# Patient Record
Sex: FEMALE | Race: WHITE
[De-identification: names, ages, dates, MRNs, and addresses within clinical notes are randomized per-mention and may not be internally consistent; named-entity substitution may affect disease eponyms.]

---

## 2017-04-17 ENCOUNTER — HOSPITAL ENCOUNTER (EMERGENCY)
Dept: HOSPITAL 25 - UCCORT | Age: 57
Discharge: HOME | End: 2017-04-17
Payer: COMMERCIAL

## 2017-04-17 VITALS — SYSTOLIC BLOOD PRESSURE: 127 MMHG | DIASTOLIC BLOOD PRESSURE: 73 MMHG

## 2017-04-17 DIAGNOSIS — Z98.84: ICD-10-CM

## 2017-04-17 DIAGNOSIS — J32.9: Primary | ICD-10-CM

## 2017-04-17 DIAGNOSIS — J45.909: ICD-10-CM

## 2017-04-17 DIAGNOSIS — E05.90: ICD-10-CM

## 2017-04-17 DIAGNOSIS — Z88.5: ICD-10-CM

## 2017-04-17 DIAGNOSIS — Z90.710: ICD-10-CM

## 2017-04-17 DIAGNOSIS — Z87.891: ICD-10-CM

## 2017-04-17 PROCEDURE — 99212 OFFICE O/P EST SF 10 MIN: CPT

## 2017-04-17 PROCEDURE — G0463 HOSPITAL OUTPT CLINIC VISIT: HCPCS

## 2017-04-17 NOTE — UC
Throat Pain/Nasal Gamal HPI





- History of Current Complaint


Chief Complaint: UCRespiratory


Stated Complaint: SINUS CONGESTION


Time Seen by Provider: 04/17/17 08:46


Hx Last Menstrual Period: age 35 yo





- Allergies/Home Medications


Allergies/Adverse Reactions: 


 Allergies











Allergy/AdvReac Type Severity Reaction Status Date / Time


 


Morphine AdvReac Severe Vomiting Verified 04/17/17 08:44


 


environmental Allergy  Eyes, Uncoded 04/17/17 08:44





   runny nose  














PMH/Surg Hx/FS Hx/Imm Hx


Endocrine History Of: Reports: Thyroid Disease - hyper


   Denies: Diabetes


Cardiovascular History Of: 


   Denies: Cardiac Disorders, Hypertension


Respiratory History Of: Reports: Asthma, Bronchitis


   Denies: COPD


GI/ History Of: 


   Denies: Ulcer


Cancer History Of: 


   Denies: Breast Cancer





- Surgical History


Surgical History: Yes


Surgery Procedure, Year, and Place: Hysterectomy age 34.  gastric sleeve





- Family History


Known Family History: Positive: None, Cardiac Disease - mi in mid 30's, Renal 

Disease, Other - familial hypercholesterolemia





- Social History


Occupation: Employed Full-time


Lives: With Family


Alcohol Use: Weekly


Alcohol Amount: 4


Substance Use Type: None


Smoking Status (MU): Former Smoker


Type: Cigarettes


Amount Used/How Often: 1/2 PACK A DAY


Length of Time of Smoking/Using Tobacco: 10 YEARS


Have You Smoked in the Last Year: No - STARTED AGE 18 QUIT AGE 28


When Did the Patient Quit Smoking/Using Tobacco: 28 YRS AGO





- Immunization History


Most Recent Influenza Vaccination: 2016


Most Recent Tetanus Shot: UP TO DATE


Most Recent Pneumonia Vaccination: HAS HAD





Review of Systems


Constitutional: Negative


Skin: Negative


Eyes: Negative


ENT: Epistaxis, Sore Throat, Nasal Discharge, Other - sinus congestion/pain


Respiratory: Cough


Cardiovascular: Negative


Neurological: Headache - sinus


All Other Systems Reviewed And Are Negative: Yes





Physical Exam


Triage Information Reviewed: Yes


Appearance: Well-Appearing, No Pain Distress, Well-Nourished


Vital Signs: 


 Initial Vital Signs











Temp  97.2 F   04/17/17 08:45


 


Pulse  68   04/17/17 08:45


 


Resp  18   04/17/17 08:45


 


BP  127/73   04/17/17 08:45


 


Pulse Ox  100   04/17/17 08:45











Vital Signs Reviewed: Yes


Eyes: Positive: Conjunctiva Clear.  Negative: Discharge


ENT: Positive: Hearing grossly normal, Pharyngeal erythema - mild, Nasal 

congestion, Nasal drainage, TMs normal.  Negative: Tonsillar swelling, 

Tonsillar exudate, Muffled/hoarse voice


Dental Exam: Normal


Neck: Positive: Supple, Nontender


Respiratory: Positive: Lungs clear, Normal breath sounds, No respiratory 

distress, No accessory muscle use


Cardiovascular: Positive: RRR, No Murmur


Musculoskeletal Exam: Normal


Neurological: Positive: Alert, Muscle Tone Normal


Psychological: Positive: Age Appropriate Behavior


Skin Exam: Normal





Throat Pain/Nasal Course/Dx





- Differential Dx/Diagnosis


Differential Diagnosis/HQI/PQRI: Pharyngitis, Sinusitis, Tonsillitis, URI


Provider Diagnoses: sinusitis





Discharge





- Discharge Plan


Condition: Stable


Disposition: HOME


Prescriptions: 


Amoxicillin/Clavulanate TAB* [Augmentin *] 875 mg PO BID #19 tab


Patient Education Materials:  Sinusitis (ED)


Referrals: 


Sakshi Herrera [Primary Care Provider] - If Needed


Additional Instructions: 


TRY USING THE NETTI POT IN THE MORNINGS AS DISCUSSED.  YOU MUST ALWAYS USE 

CLEAN WATER.





REMEMBER, POSTURE IS AN IMPORTANT FACTOR IN SINUS DRAINAGE.  MOVE YOUR NECK, 

BREATHE.





AUGMENTIN:


     Augmentin is a mixture of amoxicillin and clavulanate. Amoxicillin is a 

member of the penicillin family.  It covers the germs likely to cause ear, 

bronchial, and urinary infections better than plain penicillin.  The addition 

of clavulanate allows it to cover staph infections of the skin, as well as 

resistant cases of ear and sinus infections.  Your physician has chosen 

Augmentin for you because of the special nature of your situation.


     Augmentin is best taken with meals.  Nausea after taking the medication is 

rare, but can occur.  Diarrhea can occur, particularly in small children.  

Vaginal yeast infections, and oral thrush in infants are also common.  Contact 

your physician if these problems occur.


     Allergy to penicillins is common.  If you have had an allergic reaction to 

any drug of the penicillin family, you should never take any other penicillin.  

Notify your doctor at once if you develop hives, shortness of breath, swelling, 

or faintness.





ANY TIME YOU TAKE AN ANTIBIOTIC, IT IS IMPORTANT TO REPLENISH THE BODY'S 

BALANCE OF "GOOD" BACTERIA BY EATING HIGH QUALITY CULTURED FOOD SUCH AS YOGURT, 

SAURKRAUT OR YADIEL CHI AND/OR TAKING A PROBIOTIC SUPPLEMENT.

## 2017-10-08 ENCOUNTER — HOSPITAL ENCOUNTER (EMERGENCY)
Dept: HOSPITAL 25 - UCCORT | Age: 57
Discharge: HOME | End: 2017-10-08
Payer: COMMERCIAL

## 2017-10-08 VITALS — SYSTOLIC BLOOD PRESSURE: 109 MMHG | DIASTOLIC BLOOD PRESSURE: 62 MMHG

## 2017-10-08 DIAGNOSIS — S61.012A: Primary | ICD-10-CM

## 2017-10-08 DIAGNOSIS — Z88.5: ICD-10-CM

## 2017-10-08 DIAGNOSIS — Z87.891: ICD-10-CM
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
## 2017-10-08 PROCEDURE — G0463 HOSPITAL OUTPT CLINIC VISIT: HCPCS

## 2017-10-08 PROCEDURE — 12001 RPR S/N/AX/GEN/TRNK 2.5CM/<: CPT

## 2017-10-08 PROCEDURE — 99211 OFF/OP EST MAY X REQ PHY/QHP: CPT

## 2017-10-08 NOTE — UC
Laceration HPI





- HPI Summary


HPI Summary: 


Cut left thumb cutting up squash for a barefoot aleja recipe





- History Of Current Complaint


Chief Complaint: UCLaceration


Stated Complaint: LEFT THUMB LAC


Time Seen by Provider: 10/08/17 16:23


Hx Obtained From: Patient


Hx Last Menstrual Period: N/A


Laceration Location: Finger - left thumb


Mechanism Of Injury: Sharp Trauma


Onset/Duration: Sudden Onset


Severity: Mild


Aggravating Factors: Movement


Hands: 


  __________________________














  __________________________





 1 - laceration





Related History: Dominant Hand Right





- Allergies/Home Medications


Allergies/Adverse Reactions: 


 Allergies











Allergy/AdvReac Type Severity Reaction Status Date / Time


 


Morphine AdvReac Severe Vomiting Verified 10/08/17 16:25


 


environmental Allergy  Eyes, Uncoded 10/08/17 16:25





   runny nose  














PMH/Surg Hx/FS Hx/Imm Hx


Previously Healthy: Yes





- Surgical History


Surgical History: Yes


Surgery Procedure, Year, and Place: Hysterectomy age 34.  gastric sleeve





- Family History


Known Family History: Positive: None, Cardiac Disease - mi in mid 30's, Renal 

Disease, Other - familial hypercholesterolemia





- Social History


Occupation: Employed Full-time


Lives: With Family


Alcohol Use: Weekly


Alcohol Amount: 4


Substance Use Type: None


Smoking Status (MU): Former Smoker


Type: Cigarettes


Amount Used/How Often: 1/2 PACK A DAY


Length of Time of Smoking/Using Tobacco: 10 YEARS


Have You Smoked in the Last Year: No - STARTED AGE 18 QUIT AGE 28


When Did the Patient Quit Smoking/Using Tobacco: 28 YRS OLD





- Immunization History


Most Recent Influenza Vaccination: 2016


Most Recent Tetanus Shot: UP TO DATE


Most Recent Pneumonia Vaccination: HAS HAD





Review of Systems


Skin: Bruising - under the cut


Is Patient Immunocompromised?: No


All Other Systems Reviewed And Are Negative: Yes





Physical Exam


Triage Information Reviewed: Yes


Appearance: Well-Appearing, No Pain Distress, Well-Nourished


Vital Signs: 


 Initial Vital Signs











Temp  97.8 F   10/08/17 16:18


 


Pulse  77   10/08/17 16:18


 


Resp  20   10/08/17 16:18


 


BP  109/62   10/08/17 16:18











Vital Signs Reviewed: Yes


Eyes: Positive: Conjunctiva Clear


Respiratory Exam: Normal


Cardiovascular Exam: Normal


Musculoskeletal Exam: Normal


Neurological Exam: Normal


Psychological Exam: Normal


Skin: Positive: Other - left thumb laceration





Laceration Repair





- Laceration Repair


  ** 1


Description: Linear


Laceration Size After Repair: Length (cm) - 1.8 cm


Modified For Repair: No


Type Injection: Local


Anesthesia Used: 1.0% Lido


Cleansing Completed Via Routine Prep: Yes


Irrigation With Pressure Irrigation Device: Yes


Closure Material: Sutures


Closure Method: Single Layer


Suture Of: Skin - #7 sutures


Suture Type: Nylon - 4-0





Laceration Course/Dx





- Differential Dx - Laceration/Wound


Differental Diagnoses: Abrasion, Abscess, Avulsion, Laceration


Provider Diagnoses: 1.8 cm laceration left thumb





Discharge





- Discharge Plan


Condition: Stable


Disposition: HOME


Patient Education Materials:  Laceration (ED), Care For Your Stitches (ED)


Additional Instructions: 


F/U 10 days for suture removal.

## 2018-03-15 ENCOUNTER — HOSPITAL ENCOUNTER (EMERGENCY)
Dept: HOSPITAL 25 - UCCORT | Age: 58
Discharge: HOME | End: 2018-03-15
Payer: COMMERCIAL

## 2018-03-15 VITALS — DIASTOLIC BLOOD PRESSURE: 72 MMHG | SYSTOLIC BLOOD PRESSURE: 124 MMHG

## 2018-03-15 DIAGNOSIS — B34.9: Primary | ICD-10-CM

## 2018-03-15 PROCEDURE — 99211 OFF/OP EST MAY X REQ PHY/QHP: CPT

## 2018-03-15 PROCEDURE — 87502 INFLUENZA DNA AMP PROBE: CPT

## 2018-03-15 PROCEDURE — G0463 HOSPITAL OUTPT CLINIC VISIT: HCPCS

## 2018-03-15 NOTE — UC
Respiratory Complaint HPI





- HPI Summary


HPI Summary: 





fever/ chills since this morning


sever body aches, fatigue,


+ cough, sore throat


no n/v/d/c , no abdominal pain, no urinary sx





- History of Current Complaint


Chief Complaint: UCGeneralIllness


Stated Complaint: CHILLS,FEVER,BODY ACHES


Time Seen by Provider: 03/15/18 13:14


Hx Obtained From: Patient


Hx Last Menstrual Period: N/A


Onset/Duration: Gradual Onset, Lasting Days - 1, Still Present


Timing: Constant


Severity Initially: Severe


Severity Currently: Moderate


Pain Intensity: 0


Character: Cough: Nonproductive


Aggravating Factors: Allergens, Deep Breaths


Alleviating Factors: Nothing


Associated Signs And Symptoms: Positive: Fever, Chills, Wheezing, URI, Nasal 

Congestion.  Negative: Hemoptysis, Dizziness, Calf Pain, Calf Swelling





- Allergies/Home Medications


Allergies/Adverse Reactions: 


 Allergies











Allergy/AdvReac Type Severity Reaction Status Date / Time


 


morphine Allergy  Vomiting Verified 03/15/18 13:17


 


environmental Allergy  Eyes, Uncoded 03/15/18 13:17





   runny nose  














PMH/Surg Hx/FS Hx/Imm Hx


Endocrine History: Thyroid Disease





- Surgical History


Surgical History: Yes


Surgery Procedure, Year, and Place: Hysterectomy age 34.  gastric sleeve





- Family History


Known Family History: Positive: None, Cardiac Disease - mi in mid 30's, Renal 

Disease, Other - familial hypercholesterolemia





- Social History


Alcohol Use: Weekly


Alcohol Amount: 4


Substance Use Type: None


Smoking Status (MU): Former Smoker


Type: Cigarettes


Amount Used/How Often: 1/2 PACK A DAY


Length of Time of Smoking/Using Tobacco: 10 YEARS


Have You Smoked in the Last Year: No - STARTED AGE 18 QUIT AGE 28


When Did the Patient Quit Smoking/Using Tobacco: 28 YRS OLD





- Immunization History


Most Recent Influenza Vaccination: 2016


Most Recent Tetanus Shot: UP TO DATE


Most Recent Pneumonia Vaccination: HAS HAD





Review of Systems


Constitutional: Fever, Chills, Fatigue


Skin: Negative


Eyes: Negative


ENT: Sore Throat


Respiratory: Cough


Cardiovascular: Negative


Gastrointestinal: Negative


Genitourinary: Negative


Musculoskeletal: Arthralgia, Myalgia


Is Patient Immunocompromised?: No


All Other Systems Reviewed And Are Negative: Yes





Physical Exam


Triage Information Reviewed: Yes


Appearance: Well-Appearing, Well-Nourished


Vital Signs: 


 Initial Vital Signs











Temp  97.1 F   03/15/18 13:11


 


Pulse  75   03/15/18 13:11


 


Resp  14   03/15/18 13:11


 


BP  124/72   03/15/18 13:11


 


Pulse Ox  100   03/15/18 13:11











Vital Signs Reviewed: Yes


Eyes: Positive: Conjunctiva Clear


ENT: Positive: Normal ENT inspection, Hearing grossly normal, Pharynx normal


Neck: Positive: Supple, Nontender, No Lymphadenopathy


Respiratory: Positive: Chest non-tender, Lungs clear, Normal breath sounds


Cardiovascular: Positive: RRR, No Murmur, Pulses Normal


Abdominal Exam: Normal


Abdomen Description: Positive: Nontender, Soft.  Negative: Distended, Guarding


Bowel Sounds: Positive: Present


Skin Exam: Normal





UC Diagnostic Evaluation





- Laboratory


O2 Sat by Pulse Oximetry: 100





Respiratory Course/Dx





- Differential Dx/Diagnosis


Provider Diagnoses: viral illness





Discharge





- Discharge Plan


Condition: Stable


Disposition: HOME


Patient Education Materials:  Viral Syndrome (ED)


Referrals: 


Sakshi Herrera [Primary Care Provider] - If Needed

## 2018-05-29 ENCOUNTER — HOSPITAL ENCOUNTER (EMERGENCY)
Dept: HOSPITAL 25 - UCCORT | Age: 58
Discharge: HOME | End: 2018-05-29
Payer: COMMERCIAL

## 2018-05-29 VITALS — SYSTOLIC BLOOD PRESSURE: 120 MMHG | DIASTOLIC BLOOD PRESSURE: 77 MMHG

## 2018-05-29 DIAGNOSIS — Z98.84: ICD-10-CM

## 2018-05-29 DIAGNOSIS — Z90.710: ICD-10-CM

## 2018-05-29 DIAGNOSIS — Z87.891: ICD-10-CM

## 2018-05-29 DIAGNOSIS — Z88.5: ICD-10-CM

## 2018-05-29 DIAGNOSIS — J34.89: ICD-10-CM

## 2018-05-29 DIAGNOSIS — B02.9: Primary | ICD-10-CM

## 2018-05-29 DIAGNOSIS — J45.909: ICD-10-CM

## 2018-05-29 DIAGNOSIS — E78.5: ICD-10-CM

## 2018-05-29 PROCEDURE — G0463 HOSPITAL OUTPT CLINIC VISIT: HCPCS

## 2018-05-29 PROCEDURE — 99212 OFFICE O/P EST SF 10 MIN: CPT

## 2018-05-29 NOTE — UC
General HPI





- HPI Summary


HPI Summary: 





pt c/o malaise since end of last week. past 1-2 days noted some irritation over 

upper R breast and now has a rash that is still evolving. also notes a single 

pink spot on her R abdomen. + hx chicken pox. no shingles vaccination. pt also 

notes a sore inside her L nare. no hx mrsa.





- History of Current Complaint


Chief Complaint: UCRash


Stated Complaint: RASH


Time Seen by Provider: 05/29/18 16:12


Hx Obtained From: Patient


Hx Last Menstrual Period: N/A


Onset/Duration: Gradual Onset


Timing: Constant


Pain Intensity: 0


Aggravating: nothing


Alleviating: nothing


Associated Signs & Symptoms: Positive: Headache, Weakness





- Allergy/Home Medications


Allergies/Adverse Reactions: 


 Allergies











Allergy/AdvReac Type Severity Reaction Status Date / Time


 


morphine Allergy  Vomiting Verified 03/15/18 13:17


 


environmental Allergy  Eyes, Uncoded 03/15/18 13:17





   runny nose  














PMH/Surg Hx/FS Hx/Imm Hx


Endocrine History: Dyslipidemia


Respiratory History: Asthma





- Surgical History


Surgical History: Yes


Surgery Procedure, Year, and Place: Hysterectomy age 34.  gastric sleeve





- Family History


Known Family History: Positive: None, Cardiac Disease - mi in mid 30's, Renal 

Disease, Other - familial hypercholesterolemia





- Social History


Occupation: Employed Full-time


Lives: With Family


Alcohol Use: Daily


Alcohol Amount: glass of wine


Substance Use Type: None


Smoking Status (MU): Former Smoker


Type: Cigarettes


Amount Used/How Often: 1/2 PACK A DAY


Length of Time of Smoking/Using Tobacco: 10 YEARS


Have You Smoked in the Last Year: No - STARTED AGE 18 QUIT AGE 28


When Did the Patient Quit Smoking/Using Tobacco: 28 YRS OLD





- Immunization History


Most Recent Influenza Vaccination: 2016


Most Recent Tetanus Shot: UP TO DATE


Most Recent Pneumonia Vaccination: HAS HAD


Vaccination Up to Date: Yes





Review of Systems


Constitutional: Fatigue


Skin: Rash


Eyes: Negative


ENT: Negative


Respiratory: Negative


Cardiovascular: Negative


Gastrointestinal: Negative


Genitourinary: Negative


Motor: Negative


Neurovascular: Negative


Musculoskeletal: Myalgia


Neurological: Weakness


Psychological: Negative


Is Patient Immunocompromised?: No


All Other Systems Reviewed And Are Negative: Yes





Physical Exam


Triage Information Reviewed: Yes


Appearance: Well-Appearing


Vital Signs: 


 Initial Vital Signs











Temp  97.8 F   05/29/18 15:54


 


Pulse  68   05/29/18 15:54


 


Resp  17   05/29/18 15:54


 


BP  120/77   05/29/18 15:54


 


Pulse Ox  100   05/29/18 15:54











Vital Signs Reviewed: Yes


Eyes: Positive: Conjunctiva Clear


ENT: Positive: Pharynx normal, TMs normal, Other - small sore inside L nare 

that is tender.  Negative: Nasal congestion, Nasal drainage


Neck: Positive: Supple, Nontender, No Lymphadenopathy


Respiratory: Positive: Lungs clear, Normal breath sounds


Cardiovascular: Positive: RRR, No Murmur


Abdomen Description: Positive: Nontender, No Organomegaly, Soft


Bowel Sounds: Positive: Present


Musculoskeletal: Positive: ROM Intact


Neurological: Positive: Alert


Psychological: Positive: Age Appropriate Behavior


Skin Exam: Normal


Skin: Positive: rashes - 3 pink circular clusters across R upper breast with 

excoriation. isolated pink dot R abdomen. No axillary adenopathy.





Course/Dx





- Course


Course Of Treatment: isolated sore in L nare, will tx bactroban ointment.  rash 

was preceeded by general malaise and irritation to that area followed by the 

rash. + hx varicella. does not look c/w insect bites thus most c/w shingles. 

will tx with famvir.





- Differential Dx - Multi-Symptom


Provider Diagnoses: shingles. Sore L nare





Discharge





- Sign-Out/Discharge


Documenting (check all that apply): Discharge/Admit/Transfer





- Discharge Plan


Condition: Stable


Disposition: HOME


Prescriptions: 


Famciclovir(NF) [Famvir(NF)] 500 mg PO TID #21 tab


Mupirocin 2% OINT* [Bactroban 2 % Oint*] 1 applic TOPICAL BID 7 Days #1 tube


Patient Education Materials:  Shingles (ED)


Referrals: 


Sakshi Herrera [Primary Care Provider] - 7 Days





- Billing Disposition and Condition


Condition: STABLE


Disposition: HOME

## 2018-09-23 ENCOUNTER — HOSPITAL ENCOUNTER (EMERGENCY)
Dept: HOSPITAL 25 - UCCORT | Age: 58
Discharge: HOME | End: 2018-09-23
Payer: COMMERCIAL

## 2018-09-23 VITALS — SYSTOLIC BLOOD PRESSURE: 143 MMHG | DIASTOLIC BLOOD PRESSURE: 62 MMHG

## 2018-09-23 DIAGNOSIS — J34.89: ICD-10-CM

## 2018-09-23 DIAGNOSIS — Z88.5: ICD-10-CM

## 2018-09-23 DIAGNOSIS — H10.9: Primary | ICD-10-CM

## 2018-09-23 DIAGNOSIS — Z87.891: ICD-10-CM

## 2018-09-23 PROCEDURE — 99212 OFFICE O/P EST SF 10 MIN: CPT

## 2018-09-23 PROCEDURE — G0463 HOSPITAL OUTPT CLINIC VISIT: HCPCS

## 2018-09-23 NOTE — UC
Eye Complaint HPI





- HPI Summary


HPI Summary: 





patient has had red eyes for the past week, she thought it was allergies, the 

redness is not getting any better and woke up with crusty drainage  and burning 

in the eyes





- History of Current Complaint


Chief Complaint: UCEye


Stated Complaint: EYE COMPLAINT


Time Seen by Provider: 09/23/18 09:11


Hx Obtained From: Patient


Hx Last Menstrual Period: N/A


Pregnant?: No


Onset/Duration: Sudden Onset, Lasting Days


Timing: Constant


Severity Initially: Mild


Severity Currently: Mild


Pain Intensity: 0


Location of Injury: Conjunctiva


Character: Foreign Body Sensation


Aggravating Factor(s): Light


Associated Signs And Symptoms: Positive: Drainage (Purulent)





- Allergies/Home Medications


Allergies/Adverse Reactions: 


 Allergies











Allergy/AdvReac Type Severity Reaction Status Date / Time


 


morphine Allergy  Vomiting Verified 09/23/18 09:13


 


environmental Allergy  Eyes, Uncoded 09/23/18 09:13





   runny nose  














PMH/Surg Hx/FS Hx/Imm Hx


Previously Healthy: Yes





- Surgical History


Surgical History: Yes


Surgery Procedure, Year, and Place: Hysterectomy age 34.  gastric sleeve





- Family History


Known Family History: Positive: None, Cardiac Disease - mi in mid 30's, Renal 

Disease, Other - familial hypercholesterolemia





- Social History


Alcohol Use: Daily


Alcohol Amount: glass of wine


Substance Use Type: None


Smoking Status (MU): Former Smoker


Type: Cigarettes


Amount Used/How Often: 1/2 PACK A DAY


Length of Time of Smoking/Using Tobacco: 10 YEARS


Have You Smoked in the Last Year: No - STARTED AGE 18 QUIT AGE 28


When Did the Patient Quit Smoking/Using Tobacco: 28 YRS OLD





- Immunization History


Most Recent Influenza Vaccination: 2016


Most Recent Tetanus Shot: UP TO DATE


Most Recent Pneumonia Vaccination: HAS HAD


Vaccination Up to Date: Yes





Review of Systems


Constitutional: Negative


Skin: Negative


Eyes: Drainage, Eye Redness


ENT: Nasal Discharge


Respiratory: Negative


Cardiovascular: Negative


Gastrointestinal: Negative


Genitourinary: Negative


Motor: Negative


Neurovascular: Negative


Musculoskeletal: Negative


Neurological: Negative


Psychological: Negative


Is Patient Immunocompromised?: No


All Other Systems Reviewed And Are Negative: Yes





Physical Exam


Triage Information Reviewed: Yes


Appearance: Well-Nourished, Ill-Appearing, Pain Distress


Vital Signs: 


 Initial Vital Signs











Temp  97.4 F   09/23/18 09:06


 


Pulse  86   09/23/18 09:06


 


Resp  18   09/23/18 09:06


 


BP  143/62   09/23/18 09:06


 


Pulse Ox  100   09/23/18 09:06











Vital Signs Reviewed: Yes


Eyes: Positive: Conjunctiva Inflamed, Discharge


ENT: Positive: Pharyngeal erythema


Dental Exam: Normal


Neck exam: Normal


Neck: Positive: Supple, Nontender, No Lymphadenopathy


Respiratory Exam: Normal


Respiratory: Positive: Chest non-tender, Lungs clear, Normal breath sounds


Cardiovascular Exam: Normal


Cardiovascular: Positive: RRR, No Murmur, Pulses Normal


Abdominal Exam: Normal


Musculoskeletal Exam: Normal


Neurological Exam: Normal


Psychological Exam: Normal


Skin Exam: Normal





Eye Complaint Course/Dx





- Course


Course Of Treatment: hx obtained, exam performed ,meds reviewed, treated for 

bilateral conjunctivitis





- Differential Dx/Diagnosis


Differential Diagnosis/HQI/PQRI: Conjunctivitis, Keratitis, Uveitis


Provider Diagnoses: bilateral conjunctivitis.  sinus congestion





Discharge





- Sign-Out/Discharge


Documenting (check all that apply): Patient Departure


All imaging exams completed and their final reports reviewed: Yes





- Discharge Plan


Condition: Stable


Disposition: HOME


Prescriptions: 


Polymyx/Trimethoprim OPTH* [Polytrim OPHTH*] 1 drop BOTH EYES Q3H #1 btl


Patient Education Materials:  Conjunctivitis (ED)


Referrals: 


Johnson LOONEY,Sakshi MCCRARY [Primary Care Provider] - 


Additional Instructions: 


1. Use the drops as prescribed.


2. Warm compresses to the eyes for comfort and to relieve any drainage from the 

eye


3. Change your pillowcase daily, wash hands frequently.


4. FOllow up if not improving.





- Billing Disposition and Condition


Condition: STABLE


Disposition: Home

## 2019-01-17 ENCOUNTER — HOSPITAL ENCOUNTER (EMERGENCY)
Dept: HOSPITAL 25 - UCCORT | Age: 59
Discharge: HOME | End: 2019-01-17
Payer: COMMERCIAL

## 2019-01-17 VITALS — DIASTOLIC BLOOD PRESSURE: 81 MMHG | SYSTOLIC BLOOD PRESSURE: 151 MMHG

## 2019-01-17 DIAGNOSIS — R91.1: ICD-10-CM

## 2019-01-17 DIAGNOSIS — Z87.891: ICD-10-CM

## 2019-01-17 DIAGNOSIS — J18.9: Primary | ICD-10-CM

## 2019-01-17 DIAGNOSIS — Z88.5: ICD-10-CM

## 2019-01-17 PROCEDURE — G0463 HOSPITAL OUTPT CLINIC VISIT: HCPCS

## 2019-01-17 PROCEDURE — 71046 X-RAY EXAM CHEST 2 VIEWS: CPT

## 2019-01-17 PROCEDURE — 99212 OFFICE O/P EST SF 10 MIN: CPT

## 2019-01-17 NOTE — UC
Respiratory Complaint HPI





- HPI Summary


HPI Summary: 





Head congestion, cough, pnd x 5 days  +fevers and chills  + wheezing  decrased 

sleep second to cough No sob, cp. Pt with h/o of asthma and PNA - concerned for 

PNA  + sick contact at home/work  Pt did get flu vaccine.  Using albuterol MDI 

with short term effect.  Taking OTC analgesia/antipyretic. + po, decreased 

appetite





Pt's medications reviewed this visit





- History of Current Complaint


Chief Complaint: UCRespiratory


Stated Complaint: BODY ACHES/FEVER/COUGH


Time Seen by Provider: 01/17/19 18:29


Hx Obtained From: Patient


Hx Last Menstrual Period: N/A


Onset/Duration: Gradual Onset


Pain Intensity: 0


Character: Cough: Nonproductive


Associated Signs And Symptoms: Positive: Wheezing, URI, Nasal Congestion, 

Hoarseness





- Allergies/Home Medications


Allergies/Adverse Reactions: 


 Allergies











Allergy/AdvReac Type Severity Reaction Status Date / Time


 


morphine Allergy  Vomiting Verified 01/17/19 18:21


 


environmental Allergy  Eyes, Uncoded 01/17/19 18:21





   runny nose  











Home Medications: 


 Home Medications





Aspirin/Acetaminophen/Caffeine [Excedrin Migraine Caplet] 2 each PO PRN 01/17/ 19 [History]











PMH/Surg Hx/FS Hx/Imm Hx


Previously Healthy: Yes


Respiratory History: Asthma, Pneumonia





- Surgical History


Surgical History: Yes


Surgery Procedure, Year, and Place: Hysterectomy age 34.  gastric sleeve





- Family History


Known Family History: Positive: None, Cardiac Disease - mi in mid 30's, Renal 

Disease, Other - familial hypercholesterolemia





- Social History


Occupation: Employed Full-time


Lives: With Family


Alcohol Use: Daily


Alcohol Amount: glass of wine


Substance Use Type: None


Smoking Status (MU): Former Smoker


Type: Cigarettes


Amount Used/How Often: 1/2 PACK A DAY


Length of Time of Smoking/Using Tobacco: 10 YEARS


Have You Smoked in the Last Year: No - STARTED AGE 18 QUIT AGE 28


When Did the Patient Quit Smoking/Using Tobacco: 28 YRS OLD





- Immunization History


Most Recent Influenza Vaccination: 2016


Most Recent Tetanus Shot: UP TO DATE


Most Recent Pneumonia Vaccination: HAS HAD


Vaccination Up to Date: Yes





Review of Systems


All Other Systems Reviewed And Are Negative: Yes


Constitutional: Positive: Fever, Chills, Fatigue


Skin: Positive: Negative


ENT: Positive: Nasal Discharge, Sinus Congestion


Respiratory: Positive: Cough





Physical Exam





- Summary


Physical Exam Summary: 





Vital Signs Reviewed: Yes


A+Ox3, coarse cough, tired appearing 


Eyes: Conjunctiva Clear, MOHSEN. EOM intact and full


ENT: Hearing grossly normal  TM x 2 clear, nasal congestion, + PND, mmoist, 

uvula midline, no exudate, no erythema


Neck: Positive: Supple


Respiratory: Positive: coarse cough + scattered exp wheeze, no rhonci, + BS 

Throughout no accessory muscle use


Cardiovascular: RRR  nl s1, s2  no m/r  CBT <2  sec


abd soft + BS nt/nd  no guarding, no distension


Musculoskeletal Exam: JARQUIN x 4 without difficulty Strength Intact, ROM Intact


Neurological: Positive: Alert,  + sensation throughout


Psychological: Positive: Normal Response To Family


Skin: Positive: no rash, no ecchymosis


Triage Information Reviewed: Yes


Vital Signs: 


 Initial Vital Signs











Temp  98 F   01/17/19 18:24


 


Pulse  87   01/17/19 18:24


 


Resp  16   01/17/19 18:24


 


BP  151/81   01/17/19 18:24


 


Pulse Ox  100   01/17/19 18:24














 Diagnostic Evaluation





- Laboratory


O2 Sat by Pulse Oximetry: 100





Re-Evaluation





- Re-Evaluation


  ** First Eval


Comment: + early RLL on CXR my prelim read.  flu neg.  Will Rx Doxy, MDI, 

prednisone.  Robitussing/codeine - istop checked, consistent.  Pt reports yeast 

infections with abx - will rx diflucan.  hydrate.  secretion precaution.  

return precaution





Respiratory Course/Dx





- Course


Course Of Treatment: Wendy Emerson | Reference #: [id number] is stop.  Pt 

presents with body aches, chills, coarse cough, sinus congestion x 5 days. Pt 

with poor sleep second to coughing - little relief with OTC meds.  On exam, 

VSS.  Pt with end exp wheeze.  Will check CXR.  flu.  reassess





- Differential Dx/Diagnosis


Provider Diagnosis: 


 PNA (pneumonia)








Discharge





- Sign-Out/Discharge


Documenting (check all that apply): Patient Departure


All imaging exams completed and their final reports reviewed: No





- Discharge Plan


Condition: Stable


Disposition: HOME


Prescriptions: 


Albuterol HFA INHALER* [Ventolin HFA Inhaler*] 2 puff INH Q4H PRN #1 mdi


 PRN Reason: wheeze


DOXYcycline CAP(*) [DOXYcycline 100MG CAP(*)] 100 mg PO BID #20 cap


Fluconazole [Diflucan 150 MG (NF)] 150 mg PO ONCE PRN #1 tab


 PRN Reason: vaginal yeast infection


guaiFENesin/CODIEN 100MG-10MG* [Robitussin AC 100Mg-10Mg*] 10 ml PO Q6HR PRN #

150 ml MDD 40


 PRN Reason: Cough


predniSONE TAB* [Deltasone TAB*] 50 mg PO DAILY #5 tab


Patient Education Materials:  Pneumonia (ED)


Referrals: 


Johnson LOONEY,Sakshi MCCRARY [Primary Care Provider] - 


Additional Instructions: 


- Take antibiotics exactly as prescribed until gone


- Okay to alternate ibuprofen (Advil, Motrin) 600mg and Tylenol product (

Tylenol or Norco) every 3 hours as needed for pain or fever. Take with food.


-Use your albuterol puffer with a spacer or inhaler every 4 hours for the next 

2 days - then as needed


-Stay well hydrated - avoid excess caffeine and all alcohol


- Take prednisone as prescribed


- Okay to take over the counter cough medications OR Tobitussin and codeine a 

prescribed. This medications contains a narcotic - do not drive, operate 

machinery or drink alcohol while taking codeine


- eat regular, healthy meals


- humidify the air in the room where you sleep


- These infections are spread by oral secretions. Do not share eating or 

drinking utensils. Frequent hand washing is important. Clean items that may get 

your secretions on them such as cell phones, ipads, computer mouse, television 

remotes.  Once you have been on antibiotics for 2 days, change your pillowcase 

and your toothbrush


-Contact your doctor to arrange a follow-up appointment this week. Call your 

doctor, return here or go to the emergency department with any questions or 

concerns





Your imaging study was reviewed by the provider who treated you today. Your 

imaging study will be reviewed by a radiologist tomorrow. If there is a finding 

that is different than that discussed with your tonight, you will receive a 

call from a care team member








- Billing Disposition and Condition


Condition: STABLE


Disposition: Home

## 2019-01-18 NOTE — UC
- Progress Note


Progress Note: 





Patient Name:         PAULA RAO                                             

                        Medical Record#: [id number]


Ordering Physician: Wendy Kulkarni MD                                           

                        Acct.#: [id number]


:     1960         Age: 58   Sex: F                                   

                        Location: URGENT [facility]


Exam Date: 19                                                       

                        ADM Status: [facility] ER


Order Information:                         CHEST PA & LAT 2 VWS


Accession Number:                          [id number]


CPT:                                       84177


HISTORY: cough, fever, wheeze, asthma





COMPARISONS: 2017 





VIEWS: 4: Frontal dual-energy and lateral views of the chest.





FINDINGS:


CARDIOMEDIASTINAL SILHOUETTE: The cardiomediastinal silhouette is normal.


YAQUELIN: The yaquelin are normal.


PLEURA: There is eventration of the right hemidiaphragm. 


LUNG PARENCHYMA: There is a 0.7 cm nodule of the right midlung on the frontal 

projection


is not clearly seen on the previous examination. 


ABDOMEN: The upper abdomen is clear. There is no subphrenic gas.


BONES AND SOFT TISSUES: No bone or soft tissue abnormalities are noted.


OTHER: None.





IMPRESSION:


0.7 CM NODULE OF THE RIGHT MIDLUNG, NOT CLEARLY SEEN ON THE PREVIOUS 

EXAMINATION.


RECOMMEND FURTHER EVALUATION WITH CONTRAST-ENHANCED CT OF THE CHEST. 





PRELIMINARY FINDINGS WERE DISCUSSED WITH DR. KULKARNI AT APPROXIMATELY 7:47 AM 

ON 2019 .





R3








Preliminary Imaging Read 


R3                                                                         





____________________________________________________________


<Electronically signed by Mike Houston MD in OV>  19


Dictated By: Mike Houston MD


Dictated Date/Time: 19


Transcribed Date/Time: 19


Copy to:











CC:Wendy Kulkarni MD; Sakshi LOONEY


Imaging - [facility]                                 Imaging - [facility] 

[facility] [place]                                     Imaging - [facility] 





This report is only to be considered final once signed by the Provider(s) as 

displayed in the "<Electronically Signed by >" field (s). Absence of a 


signature indicates the report is in a draft status and still needs to be 

finalized. In the event this document was created by someone other than the 


signing Provider, the individual initiating the document will be listed in the 

"Entered by:" or "Dictated by:" fields.


                                                                 1 of 2








Will d/w pt


no 


l=








Course/Dx





- Diagnoses


Provider Diagnoses: 


 PNA (pneumonia)








Discharge





- Sign-Out/Discharge


Documenting (check all that apply): Post-Discharge Follow Up


All imaging exams completed and their final reports reviewed: Yes





- Discharge Plan


Condition: Stable


Disposition: HOME


Prescriptions: 


Albuterol HFA INHALER* [Ventolin HFA Inhaler*] 2 puff INH Q4H PRN #1 mdi


 PRN Reason: wheeze


DOXYcycline CAP(*) [DOXYcycline 100MG CAP(*)] 100 mg PO BID #20 cap


Fluconazole [Diflucan 150 MG (NF)] 150 mg PO ONCE PRN #1 tab


 PRN Reason: vaginal yeast infection


guaiFENesin/CODIEN 100MG-10MG* [Robitussin AC 100Mg-10Mg*] 10 ml PO Q6HR PRN #

150 ml MDD 40


 PRN Reason: Cough


predniSONE TAB* [Deltasone TAB*] 50 mg PO DAILY #5 tab


Patient Education Materials:  Pneumonia (ED)


Referrals: 


Johnson LOONEY,Sakshi MCCRARY [Primary Care Provider] - 


Additional Instructions: 


- Take antibiotics exactly as prescribed until gone


- Okay to alternate ibuprofen (Advil, Motrin) 600mg and Tylenol product (

Tylenol or Norco) every 3 hours as needed for pain or fever. Take with food.


-Use your albuterol puffer with a spacer or inhaler every 4 hours for the next 

2 days - then as needed


-Stay well hydrated - avoid excess caffeine and all alcohol


- Take prednisone as prescribed


- Okay to take over the counter cough medications OR Tobitussin and codeine a 

prescribed. This medications contains a narcotic - do not drive, operate 

machinery or drink alcohol while taking codeine


- eat regular, healthy meals


- humidify the air in the room where you sleep


- These infections are spread by oral secretions. Do not share eating or 

drinking utensils. Frequent hand washing is important. Clean items that may get 

your secretions on them such as cell phones, ipads, computer mouse, television 

remotes.  Once you have been on antibiotics for 2 days, change your pillowcase 

and your toothbrush


-Contact your doctor to arrange a follow-up appointment this week. Call your 

doctor, return here or go to the emergency department with any questions or 

concerns





Your imaging study was reviewed by the provider who treated you today. Your 

imaging study will be reviewed by a radiologist tomorrow. If there is a finding 

that is different than that discussed with your tonight, you will receive a 

call from a care team member








- Billing Disposition and Condition


Condition: STABLE


Disposition: Home

## 2019-01-21 NOTE — UC
Re-Evaluation





- Re-Evaluation


  ** First Eval


Comment: + early RLL on CXR my prelim read.  flu neg.  Will Rx Doxy, MDI, 

prednisone.  Robitussing/codeine - istop checked, consistent.  Pt reports yeast 

infections with abx - will rx diflucan.  hydrate.  secretion precaution.  

return precaution





Course/Dx





- Diagnoses


Provider Diagnoses: 


 PNA (pneumonia)








Discharge





- Sign-Out/Discharge


Documenting (check all that apply): Post-Discharge Follow Up


All imaging exams completed and their final reports reviewed: Yes





- Discharge Plan


Condition: Stable


Disposition: HOME


Prescriptions: 


Albuterol HFA INHALER* [Ventolin HFA Inhaler*] 2 puff INH Q4H PRN #1 mdi


 PRN Reason: wheeze


DOXYcycline CAP(*) [DOXYcycline 100MG CAP(*)] 100 mg PO BID #20 cap


Fluconazole [Diflucan 150 MG (NF)] 150 mg PO ONCE PRN #1 tab


 PRN Reason: vaginal yeast infection


guaiFENesin/CODIEN 100MG-10MG* [Robitussin AC 100Mg-10Mg*] 10 ml PO Q6HR PRN #

150 ml MDD 40


 PRN Reason: Cough


predniSONE TAB* [Deltasone TAB*] 50 mg PO DAILY #5 tab


Patient Education Materials:  Pneumonia (ED)


Referrals: 


Johnson LOONEY,Sakshi MCCRARY [Primary Care Provider] - 


Additional Instructions: 


- Take antibiotics exactly as prescribed until gone


- Okay to alternate ibuprofen (Advil, Motrin) 600mg and Tylenol product (

Tylenol or Norco) every 3 hours as needed for pain or fever. Take with food.


-Use your albuterol puffer with a spacer or inhaler every 4 hours for the next 

2 days - then as needed


-Stay well hydrated - avoid excess caffeine and all alcohol


- Take prednisone as prescribed


- Okay to take over the counter cough medications OR Tobitussin and codeine a 

prescribed. This medications contains a narcotic - do not drive, operate 

machinery or drink alcohol while taking codeine


- eat regular, healthy meals


- humidify the air in the room where you sleep


- These infections are spread by oral secretions. Do not share eating or 

drinking utensils. Frequent hand washing is important. Clean items that may get 

your secretions on them such as cell phones, ipads, computer mouse, television 

remotes.  Once you have been on antibiotics for 2 days, change your pillowcase 

and your toothbrush


-Contact your doctor to arrange a follow-up appointment this week. Call your 

doctor, return here or go to the emergency department with any questions or 

concerns





Your imaging study was reviewed by the provider who treated you today. Your 

imaging study will be reviewed by a radiologist tomorrow. If there is a finding 

that is different than that discussed with your tonight, you will receive a 

call from a care team member








- Billing Disposition and Condition


Condition: STABLE


Disposition: Home

## 2020-03-30 ENCOUNTER — HOSPITAL ENCOUNTER (EMERGENCY)
Dept: HOSPITAL 25 - UCCORT | Age: 60
Discharge: HOME | End: 2020-03-30
Payer: COMMERCIAL

## 2020-03-30 DIAGNOSIS — Z20.828: ICD-10-CM

## 2020-03-30 DIAGNOSIS — R53.83: ICD-10-CM

## 2020-03-30 DIAGNOSIS — Z87.891: ICD-10-CM

## 2020-03-30 DIAGNOSIS — R05: Primary | ICD-10-CM

## 2020-03-30 DIAGNOSIS — J45.909: ICD-10-CM

## 2020-03-30 DIAGNOSIS — K21.9: ICD-10-CM

## 2020-03-30 PROCEDURE — 99212 OFFICE O/P EST SF 10 MIN: CPT

## 2020-03-30 PROCEDURE — G0463 HOSPITAL OUTPT CLINIC VISIT: HCPCS

## 2020-03-30 PROCEDURE — 87635 SARS-COV-2 COVID-19 AMP PRB: CPT

## 2020-03-30 NOTE — [facility] HPI


HPI Summary: 





Pt presents with c/o dry cough, fatigue, and has hx of asthma that is allergen 

induce and denies fever. 





 Telehealth PMH


Previously Healthy: Yes


Endocrine/Hematology History: Reports: Hx Thyroid Disease - HYPERTHYROID- IN 

THE PAST TREATED WITH METHIMAZOLE


   Denies: Hx Diabetes


Cardiovascular History: 


   Denies: Hx Hypertension, Hx Pacemaker/ICD, Other Cardiovascular Problems/

Disorders


Respiratory History: Reports: Hx Asthma - HX OF- NO INHALERS AT THIS TIME, Hx 

Sleep Apnea - in past, Other Respiratory Problems/Disorders - PNEUMONIA -12/2018

-01/2019


   Denies: Hx Chronic Obstructive Pulmonary Disease (COPD)


GI History: Reports: Hx Gastroesophageal Reflux Disease - ON MEDICATION FOR, Hx 

Hiatal Hernia


   Denies: Hx Ulcer, Other GI Disorders


 History: 


   Denies: Hx Dialysis, Hx Renal Disease, Other  Problems/Disorders


Musculoskeletal History: 


   Denies: Other Musculoskeletal History


Sensory History: Reports: Hx Cataracts - BILATERAL, Hx Contacts or Glasses - 

INSTRUCTS GIVEN


   Denies: Hx Hearing Aid


Opthamlomology History: Reports: Hx Cataracts - BILATERAL, Hx Contacts or 

Glasses - INSTRUCTS GIVEN


Neurological History: Reports: Hx Headaches


   Denies: Other Neuro Impairments/Disorders


Psychiatric History: 


   Denies: Hx Anxiety, Hx Attention Deficit Hyperactivity Disorder, Hx Autism, 

Hx Eating Disorder, Hx Oppositional Defiance Disorder, Hx Depression, Hx Panic 

Disorder, Hx Post Traumatic Stress Disorder, Hx Inpatient Treatment, Hx 

Community Mental Health Tx, Hx Schizophrenia, Hx Bipolar Disorder, Hx Suicide 

Attempt, Hx of Violent Episodes Against Others, Hx Substance Abuse, Other 

Psychiatric Issues/Disorders





- Cancer History


Hx Chemotherapy: No


Hx Radiation Therapy: No





- Surgical History


Surgery Procedure, Year, and Place: hysterectomy, breast biopsy, gastric 

sleeve.  LEFT ARM SQUAMOUS CELL REMOVAL AND FACE


Hx Anesthesia Reactions: No


Infectious Disease History: No





- Family History


Known Family History: Positive: None, Cardiac Disease - mi in mid 30's, Renal 

Disease, Other - familial hypercholesterolemia





- Social History


Alcohol Use: Weekly


Alcohol Amount: 4-5 GLASSES OF WINE WEEKLY


Substance Use Type: Reports: None


Smoking Status (MU): Former Smoker


Type: Cigarettes


Amount Used/How Often: 1/2 PACK A DAY X 8 YEARS


Length of Time of Smoking/Using Tobacco: 10 YEARS


Have You Smoked in the Last Year: No





 Telehealth ROS


All Other Systems Reviewed And Are Negative: Yes


Positive: Fatigue


Eyes: Negative


ENT: Negative


Cardiovascular: Negative


Positive: Cough - dry, 


Gastrointestinal: Negative


Genitourinary: Negative


Musculoskeletal: Negative


Skin: Negative


Neurological/Mental Status: Negative


Psychological: Normal





 Telehealth PE


Appearance: Positive: Well-Appearing


Eyes: Positive: Normal


ENT: Positive: Hearing grossly normal


Neck: Positive: Supple - pt denies any tender "lumps or bumps"


Respiratory/Lung Sounds: Positive: Cough


Musculoskeletal: Positive: Normal Tone


Neurological: Positive: Alert, Oriented to Person Place, Time


Psychiatric: Positive: Normal





 [facility] Course/Dx


Assessment/Plan: 





Pt was seen via telehealth.  VS were not taken.  Pt instructed to seek care at 

ER if symptoms worsen.  Pt was in no observable acute distress during 

telehealth visit. 


Provider Diagnoses: 


 Cough in adult, Environmental allergies








 Telehealth Disposition


Provider Recommendation for Treatment: Urgent Care


Telehealth Visit: Patient Consented Verbally to Telehealth Visit


Telehealth Patient Statement: The patient should understand that they are 

communicating with their provider via a secure communication platform and that 

all the same privacy and confidentiality rules apply. They will also be 

responsible for copayments or coinsurances that apply to any Telehealth visit.


Patient Identifiers: 2 Patient Identifiers Verified for Telehealth Visit


Telehealth Visit Start Time: 08:50


Telehealth Visit End Time: 09:05


Telehealth Provider Attestation: The above services were appropriate to provide 

in a Telehealth setting.

## 2020-03-30 NOTE — XMS REPORT
Continuity of Care Document (CCD)

 Created on:2020



Patient:Smiley Martínez

Sex:Female

:1960

External Reference #:[id number]





Demographics







 Address  [address] [place] [address]

 

 Home Phone  [phone]

 

 Email Address  [email or web address]

 

 Preferred Language  en

 

 Marital Status  Not  or 

 

 Bahai Affiliation  Unknown

 

 Race  White

 

 Ethnic Group  Not  or 









Author







 Name  Roxana Silva O.D. (transmitted by agent of provider Tama Budinger)

 

 Address  [address] [place] [address]









Care Team Providers







 Name  Role  Phone

 

 Grant Mcgee M.D. - Internal Medicine  Care Team Information   [phone]
[phone]









Problems







 Active Problems  Provider  Date

 

 Hypercholesterolemia    Onset: 

 

 Combined form of senile cataract  Javier Escobar M.D.  Onset: 2019

 

 Vitreous degeneration  Javier Escobar M.D.  Onset: 2019

 

 Presence of intraocular lens  Javier Escobar M.D.  Onset: 2019

 

 Meibomian gland dysfunction left upper  Roxana Silva O.D.  Onset: 2020



 eyelid    

 

 Meibomian gland dysfunction right upper  Roxana Silva O.D.  Onset: 



 eyelid    

 

 Tear film insufficiency  Roxana Silva O.D.  Onset: 2020







Social History







 Type  Date  Description  Comments

 

 Birth Sex    Unknown  

 

 ETOH Use    Occasionally consumes alcohol  

 

 Tobacco Use  Start: Unknown  Patient has never smoked  

 

 Smoking Status  Reviewed: 20  Patient has never smoked  







Allergies, Adverse Reactions, Alerts







 Active Allergies  Reaction  Severity  Comments  Date

 

 Morphine        2019







Medications







 Active Medications  SIG  Qnty  Indications  Ordering Provider  Date

 

 Artificial Tears        Javier Escobar,  2019



              1-0.3%        M.D.  



 Solution          



           

 

 Atorvastatin Calcium        Unknown  



                  80mg          



 Tablets          



           

 

 Premarin        Unknown  



      0.3mg Tablets          



           

 

 Aspirin 81        Unknown  



        81mg Tablets DR          



           

 

 Melatonin Maximum        Unknown  



 Strength          



      5mg Tablets          



           

 

 Claritin  1 per day as      Unknown  



      10mg Capsules  needed        



           







Immunizations







 Description

 

 No Information Available







Vital Signs







 Description

 

 No Information Available







Results







 Description

 

 No Information Available







Procedures







 Date  Code  Description  Status

 

 2020  65118  Est Patient Comprehensive Exam  Completed







Medical Devices







 Description

 

 No Information Available







Encounters







 Description

 

 No Information Available







Assessments







 Date  Code  Description  Provider

 

 2020  H04.123  Dry eye syndrome of bilateral lacrimal  Roxana Silva O.D.



     glands  

 

 2020  H02.881  Meibomian gland dysfunction right upper  Roxana Silva O.D.



     eyelid  

 

 2020  H02.884  Meibomian gland dysfunction left upper  Roxana Silva O.D.



     eyelid  

 

 2020  Z96.1  Presence of intraocular lens  Roxana Silva O.D.

 

 10/16/2019  H43.813  Vitreous degeneration, bilateral  Roxana Silva O.D.

 

 10/16/2019  Z96.1  Presence of intraocular lens  Roxana Silva O.D.







Plan of Treatment

2020 - Roxana Silva O.D.H04.123 Dry eye syndrome of bilateral 
lacrimal glandsComments:Smoking can increase the risk of developing or 
worsening any eye related disease, as well as affect your overall health.  If 
you are a smoker, we strongly recommend that you quit.If you are not a smoker, 
we strongly recommend that you do not start.  USE ARTIFICIAL TEARS VERY OFTEN 
THROUGHOUT THE DAYTAKE 1000MG OF OMEGA 3s SUCH AS COD LIVER OIL OR FLAX SEED 
OILUSE HOT COMPRESSES EVERY NIGHT AND MASSAGE YOUR EYELIDSMAKE SURE TO REMOVE 
ALL OF YOUR MAKE UP EVERY NIGHTFollow up:1 Year Follow Up  You can expect to 
have your eyes dilated at your next visit. If Dr. Silva orders any 
additional testing, it may require extra time. We recommend that you bring 
sunglasses, as dilation drops often make you light sensitive until they wear 
off. We always recommend you bring someone to drive you home if you are 
uncomfortable driving with your eyes dilated. If you have any questions before 
your next visit, feel free to call our office at [phone].H02.881 
Meibomian gland dysfunction right upper [id number] Meibomian gland 
dysfunction left upper [id number] Presence of intraocular lens



Functional Status







 Description

 

 No Information Available







Mental Status







 Description

 

 No Information Available







Referrals







 Description

 

 No Information Available

## 2020-03-30 NOTE — XMS REPORT
Continuity of Care Document (CCD)

 Created on:2020



Patient:Smiley Martínez

Sex:Female

:1960

External Reference #:[id number]





Demographics







 Address  [address] [place] [id number]

 

 Mobile Phone  [phone]

 

 Work Phone  [phone]

 

 Email Address  [email or web address]

 

 Preferred Language  en

 

 Marital Status  Not  or 

 

 Druze Affiliation  Unknown

 

 Race  White

 

 Ethnic Group  Not  or 









Author







 Name  Angel Desir MD (transmitted by agent of provider Swapnil Green)

 

 Address  [address] [place] [id number]









Care Team Providers







 Name  Role  Phone

 

 Grant Mcgee MD - Internal Medicine  Care Team Information   [phone]
[phone]









Problems







 Active Problems  Provider  Date

 

 Difficulty breathing  Deepali Allred MD  Onset: 10/27/2015

 

 Obesity  Deepali Allred MD  Onset: 10/27/2015

 

 Obstructive sleep apnea syndrome  Pricila Block DNP, RN, FNP-BC  Onset: 

 

 Electrocardiogram abnormal  Jonathan Jack M.D., [facility],  Onset: 2016



   FASNC  







Social History







 Type  Date  Description  Comments

 

 Birth Sex    Unknown  

 

 Tobacco Use  Start: Unknown End:  Former Cigarette Smoker  



   Unknown    

 

 ETOH Use    consumes 1-2 glasses of  



     wine per week  

 

 Tobacco Use  Start: Unknown End:  Patient is a former  



   Unknown  smoker  

 

 Recreational Drug Use    Denies Drug Use  

 

 Tobacco Use  Start: Unknown  Quit when 28 years old  

 

 Smoking Status  Reviewed: 20  Quit when 28 years old  

 

 Exercise Type/Frequency    Exercises regularly  3 times per week







Allergies, Adverse Reactions, Alerts







 Active Allergies  Reaction  Severity  Comments  Date

 

 Morphine  vomiting    per patient  2013







Medications







 Active Medications  SIG  Qnty  Indications  Ordering  Date



         Provider  

 

 Premarin  1 po qd  90tabs    Unknown  



         0.3mg          



 Tablets          



           

 

 Lipitor  1 po hs  90tabs    Unknown  



        80mg Tablets          



           

 

 Albuterol Sulfate  1 unit dose via      Unknown  



   nebulizer every 4-6        



 (2.5mg/3ML) 0.083%  hours as needed        



 Nebulizer          



           

 

 Imitrex  tablet at onset of      Unknown  



        50mg Tablets  migraine if        



   headache persists        



   after two hours        



   repeat dose        

 

 Multivitamin Adult  1 by mouth every      Unknown  



   day        



 Tablets          



           

 

 Ventolin HFA  1 to 2 inhalations      Unknown  



   every 4 hours as        



 108(90Base) mcg/Act  needed        



 Aerosol          



           

 

 Venlafaxine HCL ER  1 by mouth every      Unknown  



   day        



 37.5mg Caps ER 24HR          



           







Medications Administered in Office







 Medication  SIG  Qnty  Indications  Ordering Provider  Date

 

 Technetium TC 99M TetrofJunior puente M.D.  2016



 Per Unit Dose Up To 40          



 Millicuries          



   Injection          







Immunizations







 Description

 

 No Information Available







Vital Signs







 Date  Vital  Result  Comment

 

 2020 10:04am  Height  64.25 inches  5'4.25"









 Weight  214.38 lb  

 

 Heart Rate  78 /min  

 

 BP Systolic Sitting  122 mmHg  

 

 BP Diastolic Sitting  76 mmHg  

 

 Respiratory Rate  18 /min  

 

 Pain Level  3  

 

 O2 % BldC Oximetry  98 %  

 

 BMI (Body Mass Index)  36.5 kg/m2  







Results







 Description

 

 No Information Available







Procedures







 Date  Code  Description  Status

 

 2020  91747  Inject/Drain Joint/Bursa Major W/O US  Completed

 

 2016  10799860  Mammogram  Completed







Medical Devices







 Description

 

 No Information Available







Encounters







 Type  Date  Location  Provider  Dx  Diagnosis

 

 Office Visit  2019  1:00p  [facility]  Nicholas Freed NP  R51
  Headache









 H53.143  Visual discomfort, bilateral

 

 M54.2  Cervicalgia

 

 M54.81  Occipital neuralgia







Assessments







 Date  Code  Description  Provider

 

 2020  M25.462  Effusion, left knee  Angel Desir MD

 

 2020  M22.2x2  Patellofemoral disorders, left knee  Angel Desir MD

 

 2019  R51  Headache  Nicholas Freed NP

 

 2019  H53.143  Visual discomfort, bilateral  Nicholas Freed NP

 

 2019  M54.2  Cervicalgia  Nicholas Freed NP

 

 2019  M54.81  Occipital neuralgia  Nicholas Freed NP







Plan of Treatment

2020 - Angel Desir, MDM25.462 Effusion, left kneeComments:ice/elevate 
, home exercises sheetsFollow up:Follow up:   As umhbbfV74.2x2 Patellofemoral 
disorders, left knee



Functional Status







 Description

 

 No Information Available







Mental Status







 Description

 

 No Information Available







Referrals







 Description

 

 No Information Available

## 2020-04-01 NOTE — [facility]
- Progress Note


Progress Note: 





please call the pt. 


COVID19 undetected


cont. with symptomatic treatment , may stop self Quarantine 


cont. with social distancing 


follow up as needed





Course/Dx





- Diagnoses


Provider Diagnoses: 


 Cough in adult, Environmental allergies








Discharge ED





- Sign-Out/Discharge


Documenting (check all that apply): Patient Departure


All imaging exams completed and their final reports reviewed: No Studies





- Discharge Plan


Condition: Stable


Disposition: HOME


Prescriptions: 


Albuterol HFA INHALER* [Ventolin HFA Inhaler*] 1 - 2 puff INH Q4H PRN #1 mdi


 PRN Reason: Sob/Wheezing


Benzonatate CAP* [Tessalon 100 MG CAP*] 100 mg PO Q8H PRN #30 cap


 PRN Reason: Cough


Loratadine 10 mg PO DAILY #30 tablet


predniSONE 20 mg TAB [Deltasone 20 MG TAB*] 60 mg PO DAILY #19 tab


Patient Education Materials:  Acute Cough (ED)


Forms:  COVID-19 Tested & Isolation


Referrals: 


Johnson LOONEY,Sakshi MCCRARY [Primary Care Provider] - If Needed





- Billing Disposition and Condition


Condition: STABLE


Disposition: Home